# Patient Record
Sex: FEMALE | Race: WHITE | NOT HISPANIC OR LATINO | URBAN - METROPOLITAN AREA
[De-identification: names, ages, dates, MRNs, and addresses within clinical notes are randomized per-mention and may not be internally consistent; named-entity substitution may affect disease eponyms.]

---

## 2017-06-04 ENCOUNTER — EMERGENCY (EMERGENCY)
Facility: HOSPITAL | Age: 52
LOS: 1 days | Discharge: HOME | End: 2017-06-04
Attending: EMERGENCY MEDICINE | Admitting: EMERGENCY MEDICINE
Payer: COMMERCIAL

## 2017-06-04 VITALS
RESPIRATION RATE: 16 BRPM | TEMPERATURE: 98 F | HEART RATE: 63 BPM | OXYGEN SATURATION: 99 % | SYSTOLIC BLOOD PRESSURE: 144 MMHG | DIASTOLIC BLOOD PRESSURE: 63 MMHG

## 2017-06-04 VITALS
SYSTOLIC BLOOD PRESSURE: 162 MMHG | TEMPERATURE: 98 F | DIASTOLIC BLOOD PRESSURE: 91 MMHG | WEIGHT: 149.91 LBS | HEART RATE: 69 BPM | OXYGEN SATURATION: 98 % | HEIGHT: 61 IN | RESPIRATION RATE: 15 BRPM
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
## 2017-06-04 DIAGNOSIS — M54.5 LOW BACK PAIN: ICD-10-CM
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
## 2017-06-04 DIAGNOSIS — S13.4XXA SPRAIN OF LIGAMENTS OF CERVICAL SPINE, INITIAL ENCOUNTER: ICD-10-CM

## 2017-06-04 DIAGNOSIS — S29.011A STRAIN OF MUSCLE AND TENDON OF FRONT WALL OF THORAX, INITIAL ENCOUNTER: ICD-10-CM

## 2017-06-04 PROCEDURE — 72131 CT LUMBAR SPINE W/O DYE: CPT | Mod: 26

## 2017-06-04 PROCEDURE — 71250 CT THORAX DX C-: CPT

## 2017-06-04 PROCEDURE — 72131 CT LUMBAR SPINE W/O DYE: CPT

## 2017-06-04 PROCEDURE — 99284 EMERGENCY DEPT VISIT MOD MDM: CPT

## 2017-06-04 PROCEDURE — 72125 CT NECK SPINE W/O DYE: CPT

## 2017-06-04 PROCEDURE — 71250 CT THORAX DX C-: CPT | Mod: 26

## 2017-06-04 PROCEDURE — 72125 CT NECK SPINE W/O DYE: CPT | Mod: 26

## 2017-06-04 RX ORDER — IBUPROFEN 200 MG
600 TABLET ORAL ONCE
Qty: 0 | Refills: 0 | Status: COMPLETED | OUTPATIENT
Start: 2017-06-04 | End: 2017-06-04

## 2017-06-04 RX ORDER — IBUPROFEN 200 MG
1 TABLET ORAL
Qty: 20 | Refills: 0 | OUTPATIENT
Start: 2017-06-04 | End: 2017-06-09

## 2017-06-04 RX ORDER — CYCLOBENZAPRINE HYDROCHLORIDE 10 MG/1
1 TABLET, FILM COATED ORAL
Qty: 9 | Refills: 0 | OUTPATIENT
Start: 2017-06-04 | End: 2017-06-07

## 2017-06-04 RX ADMIN — Medication 600 MILLIGRAM(S): at 16:47
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
## 2017-06-04 NOTE — ED ADULT NURSE NOTE - CHPI ED SYMPTOMS NEG
no dizziness/no loss of consciousness/no disorientation/no crying/no difficulty bearing weight/no headache/no laceration/no sleeping issues
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
## 2017-06-04 NOTE — ED PROVIDER NOTE - PROGRESS NOTE DETAILS
results discussed, advised follow up with pmd and ortho, given information for Dr García, rx for flexeril and motrin sent to pharmacy, copy of results given
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
## 2019-05-05 ENCOUNTER — INPATIENT (INPATIENT)
Facility: HOSPITAL | Age: 54
LOS: 0 days | Discharge: HOME | DRG: 71 | End: 2019-05-06
Attending: FAMILY MEDICINE | Admitting: HOSPITALIST
Payer: COMMERCIAL

## 2019-05-05 VITALS
WEIGHT: 151.9 LBS | SYSTOLIC BLOOD PRESSURE: 180 MMHG | RESPIRATION RATE: 14 BRPM | DIASTOLIC BLOOD PRESSURE: 78 MMHG | TEMPERATURE: 98 F | HEART RATE: 82 BPM

## 2019-05-05 DIAGNOSIS — Z29.9 ENCOUNTER FOR PROPHYLACTIC MEASURES, UNSPECIFIED: ICD-10-CM

## 2019-05-05 DIAGNOSIS — S09.90XA UNSPECIFIED INJURY OF HEAD, INITIAL ENCOUNTER: ICD-10-CM

## 2019-05-05 DIAGNOSIS — R52 PAIN, UNSPECIFIED: ICD-10-CM

## 2019-05-05 DIAGNOSIS — Z98.890 OTHER SPECIFIED POSTPROCEDURAL STATES: Chronic | ICD-10-CM

## 2019-05-05 LAB
ALBUMIN SERPL ELPH-MCNC: 3.8 G/DL — SIGNIFICANT CHANGE UP (ref 3.3–5)
ALP SERPL-CCNC: 86 U/L — SIGNIFICANT CHANGE UP (ref 40–120)
ALT FLD-CCNC: 31 U/L — SIGNIFICANT CHANGE UP (ref 12–78)
ANION GAP SERPL CALC-SCNC: 7 MMOL/L — SIGNIFICANT CHANGE UP (ref 5–17)
APTT BLD: 31.8 SEC — SIGNIFICANT CHANGE UP (ref 27.5–36.3)
AST SERPL-CCNC: 20 U/L — SIGNIFICANT CHANGE UP (ref 15–37)
BILIRUB SERPL-MCNC: 0.2 MG/DL — SIGNIFICANT CHANGE UP (ref 0.2–1.2)
BUN SERPL-MCNC: 17 MG/DL — SIGNIFICANT CHANGE UP (ref 7–23)
CALCIUM SERPL-MCNC: 8.7 MG/DL — SIGNIFICANT CHANGE UP (ref 8.5–10.1)
CHLORIDE SERPL-SCNC: 107 MMOL/L — SIGNIFICANT CHANGE UP (ref 96–108)
CO2 SERPL-SCNC: 27 MMOL/L — SIGNIFICANT CHANGE UP (ref 22–31)
CREAT SERPL-MCNC: 0.69 MG/DL — SIGNIFICANT CHANGE UP (ref 0.5–1.3)
GLUCOSE SERPL-MCNC: 98 MG/DL — SIGNIFICANT CHANGE UP (ref 70–99)
HCG SERPL-ACNC: 3 MIU/ML — SIGNIFICANT CHANGE UP
HCT VFR BLD CALC: 33.4 % — LOW (ref 34.5–45)
HGB BLD-MCNC: 11.4 G/DL — LOW (ref 11.5–15.5)
INR BLD: 0.99 RATIO — SIGNIFICANT CHANGE UP (ref 0.88–1.16)
MCHC RBC-ENTMCNC: 29.5 PG — SIGNIFICANT CHANGE UP (ref 27–34)
MCHC RBC-ENTMCNC: 34.1 GM/DL — SIGNIFICANT CHANGE UP (ref 32–36)
MCV RBC AUTO: 86.5 FL — SIGNIFICANT CHANGE UP (ref 80–100)
NRBC # BLD: 0 /100 WBCS — SIGNIFICANT CHANGE UP (ref 0–0)
PLATELET # BLD AUTO: 274 K/UL — SIGNIFICANT CHANGE UP (ref 150–400)
POTASSIUM SERPL-MCNC: 3.7 MMOL/L — SIGNIFICANT CHANGE UP (ref 3.5–5.3)
POTASSIUM SERPL-SCNC: 3.7 MMOL/L — SIGNIFICANT CHANGE UP (ref 3.5–5.3)
PROT SERPL-MCNC: 7.3 G/DL — SIGNIFICANT CHANGE UP (ref 6–8.3)
PROTHROM AB SERPL-ACNC: 11.2 SEC — SIGNIFICANT CHANGE UP (ref 10–12.9)
RBC # BLD: 3.86 M/UL — SIGNIFICANT CHANGE UP (ref 3.8–5.2)
RBC # FLD: 12.5 % — SIGNIFICANT CHANGE UP (ref 10.3–14.5)
SODIUM SERPL-SCNC: 141 MMOL/L — SIGNIFICANT CHANGE UP (ref 135–145)
WBC # BLD: 6.55 K/UL — SIGNIFICANT CHANGE UP (ref 3.8–10.5)
WBC # FLD AUTO: 6.55 K/UL — SIGNIFICANT CHANGE UP (ref 3.8–10.5)

## 2019-05-05 PROCEDURE — 99223 1ST HOSP IP/OBS HIGH 75: CPT | Mod: AI,GC

## 2019-05-05 PROCEDURE — 70450 CT HEAD/BRAIN W/O DYE: CPT | Mod: 26

## 2019-05-05 PROCEDURE — 99285 EMERGENCY DEPT VISIT HI MDM: CPT

## 2019-05-05 PROCEDURE — 93010 ELECTROCARDIOGRAM REPORT: CPT

## 2019-05-05 RX ORDER — CYCLOBENZAPRINE HYDROCHLORIDE 10 MG/1
10 TABLET, FILM COATED ORAL THREE TIMES A DAY
Qty: 0 | Refills: 0 | Status: DISCONTINUED | OUTPATIENT
Start: 2019-05-05 | End: 2019-05-05

## 2019-05-05 RX ORDER — ONDANSETRON 8 MG/1
4 TABLET, FILM COATED ORAL EVERY 6 HOURS
Qty: 0 | Refills: 0 | Status: DISCONTINUED | OUTPATIENT
Start: 2019-05-05 | End: 2019-05-06

## 2019-05-05 RX ORDER — MECLIZINE HCL 12.5 MG
25 TABLET ORAL ONCE
Qty: 0 | Refills: 0 | Status: COMPLETED | OUTPATIENT
Start: 2019-05-05 | End: 2019-05-05

## 2019-05-05 RX ORDER — ACETAMINOPHEN 500 MG
650 TABLET ORAL EVERY 6 HOURS
Qty: 0 | Refills: 0 | Status: DISCONTINUED | OUTPATIENT
Start: 2019-05-05 | End: 2019-05-06

## 2019-05-05 RX ORDER — ACETAMINOPHEN 500 MG
650 TABLET ORAL ONCE
Qty: 0 | Refills: 0 | Status: COMPLETED | OUTPATIENT
Start: 2019-05-05 | End: 2019-05-05

## 2019-05-05 RX ORDER — CYCLOBENZAPRINE HYDROCHLORIDE 10 MG/1
5 TABLET, FILM COATED ORAL ONCE
Qty: 0 | Refills: 0 | Status: COMPLETED | OUTPATIENT
Start: 2019-05-05 | End: 2019-05-05

## 2019-05-05 RX ORDER — TETANUS TOXOID, REDUCED DIPHTHERIA TOXOID AND ACELLULAR PERTUSSIS VACCINE, ADSORBED 5; 2.5; 8; 8; 2.5 [IU]/.5ML; [IU]/.5ML; UG/.5ML; UG/.5ML; UG/.5ML
0.5 SUSPENSION INTRAMUSCULAR ONCE
Qty: 0 | Refills: 0 | Status: COMPLETED | OUTPATIENT
Start: 2019-05-05 | End: 2019-05-05

## 2019-05-05 RX ADMIN — TETANUS TOXOID, REDUCED DIPHTHERIA TOXOID AND ACELLULAR PERTUSSIS VACCINE, ADSORBED 0.5 MILLILITER(S): 5; 2.5; 8; 8; 2.5 SUSPENSION INTRAMUSCULAR at 19:12

## 2019-05-05 RX ADMIN — Medication 650 MILLIGRAM(S): at 18:57

## 2019-05-05 RX ADMIN — Medication 25 MILLIGRAM(S): at 18:30

## 2019-05-05 RX ADMIN — Medication 650 MILLIGRAM(S): at 18:30

## 2019-05-05 RX ADMIN — CYCLOBENZAPRINE HYDROCHLORIDE 5 MILLIGRAM(S): 10 TABLET, FILM COATED ORAL at 18:30
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
## 2019-05-05 NOTE — H&P ADULT - NSHPREVIEWOFSYSTEMS_GEN_ALL_CORE
Constitutional: denies fever, chills  HEENT: admits L sided blurry vision and orbital pressure NO difficulty hearing  Respiratory: denies SOB, MORALES, cough, sputum production, wheezing  Cardiovascular: denies CP, palpitations, LE edema  Gastrointestinal: denies nausea, vomiting, diarrhea, constipation, abdominal pain  Genitourinary: denies dysuria, frequency, urgency, hematuria   Skin: Denies rashes, itching  Musculoskeletal: Admits left sided body aches and muscle weakness  Neurologic: Admits diffuse headache, no loss of sensation, n/t   Hematology/Oncology: admits left arm bruising secondary to fall denies bleeding, easy bruising  Psych: Denies changes in mood  ROS negative except as noted above

## 2019-05-05 NOTE — ED ADULT NURSE NOTE - NSIMPLEMENTINTERV_GEN_ALL_ED
Implemented All Fall Risk Interventions:  [place] to call system. Call bell, personal items and telephone within reach. Instruct patient to call for assistance. Room bathroom lighting operational. Non-slip footwear when patient is off stretcher. Physically safe environment: no spills, clutter or unnecessary equipment. Stretcher in lowest position, wheels locked, appropriate side rails in place. Provide visual cue, wrist band, yellow gown, etc. Monitor gait and stability. Monitor for mental status changes and reorient to person, place, and time. Review medications for side effects contributing to fall risk. Reinforce activity limits and safety measures with patient and family.

## 2019-05-05 NOTE — H&P ADULT - PROBLEM SELECTOR PLAN 3
IMPROVE VTE Individual Risk Assessment          RISK                                                          Points  [  ] Previous VTE                                                3  [  ] Thrombophilia                                             2  [  ] Lower limb paralysis                                   2        (unable to hold up >15 seconds)    [  ] Current Cancer                                             2         (within 6 months)  [  ] Immobilization > 24 hrs                              1  [  ] ICU/CCU stay > 24 hours                             1  [  ] Age > 60                                                         1    IMPROVE VTE Score: 0    SCD's, encourage ambulation

## 2019-05-05 NOTE — PATIENT PROFILE ADULT - NSPROCHRONICPAINRELIEVE_GEN_A_NUR
Medication refilled for 90 days per protocol. Patient advised.      Hypertensive Medications  Protocol Criteria:  · Appointment scheduled in the past 6 months or in the next 3 months  · BMP or CMP in the past 12 months  · Creatinine result < 2  Recent Outpa medications
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
## 2019-05-05 NOTE — H&P ADULT - PROBLEM SELECTOR PLAN 1
Admit to [facility] Admit to [facility]  CT head showing: Right frontal white matter hypodensity likely indicating vasogenic edema  Neuro checks Q4  MRI in AM  Zofran for nausea  Neurology consulted Dr. Fraire  Ambulate with assistance
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
## 2019-05-05 NOTE — H&P ADULT - NSHPPHYSICALEXAM_GEN_ALL_CORE
Physical Exam:  General: Well developed, well nourished, NAD  HEENT: linear scar on left forehead, tender to palpation of forehead b/l, PERRLA, EOMI bl, moist mucous membranes   Neck: Supple, nontender, no mass  Neurology: A&Ox3, nonfocal, CN II-XII grossly intact, sensation intact   Respiratory: CTA B/L, No W/R/R  CV: RRR, +S1/S2, no murmurs, rubs or gallops  Abdominal: Soft, NT, ND +BSx4, no palpable masses  Extremities: No C/C/E, + peripheral pulses  MSK: muscle strength 4/5 LUE and LLE secondary to pain, 5/5 RUE, RLE   Heme: No palpable supraclavicular nodules, no petechiae   Skin: warm, dry, normal color Physical Exam:  General: Well developed, well nourished, NAD  HEENT: linear scar on left forehead, tender to palpation of forehead b/l, PERRLA, EOMI bl, moist mucous membranes   Neck: Supple, nontender, no mass  Neurology: A&Ox3, nonfocal, CN II-XII grossly intact, sensation intact   Respiratory: CTA B/L, No W/R/R  CV: RRR, +S1/S2, no murmurs, rubs or gallops  Abdominal: Soft, NT, ND +BSx4, no palpable masses  Extremities: No C/C/E, + peripheral pulses  MSK: muscle strength 5/5 LUE and LLE ROM ltd secondary to pain, 5/5 RUE, RLE   Heme: No palpable supraclavicular nodules, no petechiae   Skin: warm, dry, normal color

## 2019-05-05 NOTE — H&P ADULT - NSHPSOCIALHISTORY_GEN_ALL_CORE
Lives at home with   Does not require assistance with ambulation  Refuses flu vaccine, HIV/ Hep C  Denies tobacco, alcohol and drug use  lmp 6 yrs ago Lives at home with   Does not require assistance with ambulation  Refuses flu vaccine, HIV screening  Denies tobacco, alcohol and drug use  lmp 6 yrs ago

## 2019-05-05 NOTE — H&P ADULT - ATTENDING COMMENTS
Pt with vasogenic edema.  No further intervention for tonight per Neuro.  Recommends MRI in the morning.
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
## 2019-05-06 VITALS
RESPIRATION RATE: 16 BRPM | DIASTOLIC BLOOD PRESSURE: 74 MMHG | TEMPERATURE: 98 F | SYSTOLIC BLOOD PRESSURE: 120 MMHG | HEART RATE: 62 BPM | OXYGEN SATURATION: 97 %

## 2019-05-06 LAB
ALBUMIN SERPL ELPH-MCNC: 3.5 G/DL — SIGNIFICANT CHANGE UP (ref 3.3–5)
ALP SERPL-CCNC: 80 U/L — SIGNIFICANT CHANGE UP (ref 40–120)
ALT FLD-CCNC: 30 U/L — SIGNIFICANT CHANGE UP (ref 12–78)
ANION GAP SERPL CALC-SCNC: 5 MMOL/L — SIGNIFICANT CHANGE UP (ref 5–17)
AST SERPL-CCNC: 17 U/L — SIGNIFICANT CHANGE UP (ref 15–37)
BILIRUB SERPL-MCNC: 0.4 MG/DL — SIGNIFICANT CHANGE UP (ref 0.2–1.2)
BUN SERPL-MCNC: 14 MG/DL — SIGNIFICANT CHANGE UP (ref 7–23)
CALCIUM SERPL-MCNC: 8.7 MG/DL — SIGNIFICANT CHANGE UP (ref 8.5–10.1)
CHLORIDE SERPL-SCNC: 108 MMOL/L — SIGNIFICANT CHANGE UP (ref 96–108)
CO2 SERPL-SCNC: 30 MMOL/L — SIGNIFICANT CHANGE UP (ref 22–31)
CREAT SERPL-MCNC: 0.68 MG/DL — SIGNIFICANT CHANGE UP (ref 0.5–1.3)
GLUCOSE SERPL-MCNC: 100 MG/DL — HIGH (ref 70–99)
HCT VFR BLD CALC: 33 % — LOW (ref 34.5–45)
HCV AB S/CO SERPL IA: 0.07 S/CO — SIGNIFICANT CHANGE UP (ref 0–0.99)
HCV AB SERPL-IMP: SIGNIFICANT CHANGE UP
HGB BLD-MCNC: 11.1 G/DL — LOW (ref 11.5–15.5)
MCHC RBC-ENTMCNC: 29.8 PG — SIGNIFICANT CHANGE UP (ref 27–34)
MCHC RBC-ENTMCNC: 33.6 GM/DL — SIGNIFICANT CHANGE UP (ref 32–36)
MCV RBC AUTO: 88.5 FL — SIGNIFICANT CHANGE UP (ref 80–100)
NRBC # BLD: 0 /100 WBCS — SIGNIFICANT CHANGE UP (ref 0–0)
PLATELET # BLD AUTO: 259 K/UL — SIGNIFICANT CHANGE UP (ref 150–400)
POTASSIUM SERPL-MCNC: 4 MMOL/L — SIGNIFICANT CHANGE UP (ref 3.5–5.3)
POTASSIUM SERPL-SCNC: 4 MMOL/L — SIGNIFICANT CHANGE UP (ref 3.5–5.3)
PROT SERPL-MCNC: 6.7 G/DL — SIGNIFICANT CHANGE UP (ref 6–8.3)
RBC # BLD: 3.73 M/UL — LOW (ref 3.8–5.2)
RBC # FLD: 12.5 % — SIGNIFICANT CHANGE UP (ref 10.3–14.5)
SODIUM SERPL-SCNC: 143 MMOL/L — SIGNIFICANT CHANGE UP (ref 135–145)
WBC # BLD: 4.53 K/UL — SIGNIFICANT CHANGE UP (ref 3.8–10.5)
WBC # FLD AUTO: 4.53 K/UL — SIGNIFICANT CHANGE UP (ref 3.8–10.5)

## 2019-05-06 PROCEDURE — 70553 MRI BRAIN STEM W/O & W/DYE: CPT | Mod: 26

## 2019-05-06 PROCEDURE — 70544 MR ANGIOGRAPHY HEAD W/O DYE: CPT

## 2019-05-06 PROCEDURE — 70553 MRI BRAIN STEM W/O & W/DYE: CPT

## 2019-05-06 PROCEDURE — 70450 CT HEAD/BRAIN W/O DYE: CPT

## 2019-05-06 PROCEDURE — 85730 THROMBOPLASTIN TIME PARTIAL: CPT

## 2019-05-06 PROCEDURE — 85610 PROTHROMBIN TIME: CPT

## 2019-05-06 PROCEDURE — 85027 COMPLETE CBC AUTOMATED: CPT

## 2019-05-06 PROCEDURE — 80053 COMPREHEN METABOLIC PANEL: CPT

## 2019-05-06 PROCEDURE — 93005 ELECTROCARDIOGRAM TRACING: CPT

## 2019-05-06 PROCEDURE — 86803 HEPATITIS C AB TEST: CPT

## 2019-05-06 PROCEDURE — 70544 MR ANGIOGRAPHY HEAD W/O DYE: CPT | Mod: 26,59

## 2019-05-06 PROCEDURE — 99285 EMERGENCY DEPT VISIT HI MDM: CPT | Mod: 25

## 2019-05-06 PROCEDURE — 90471 IMMUNIZATION ADMIN: CPT

## 2019-05-06 PROCEDURE — 90715 TDAP VACCINE 7 YRS/> IM: CPT

## 2019-05-06 PROCEDURE — 99239 HOSP IP/OBS DSCHRG MGMT >30: CPT

## 2019-05-06 PROCEDURE — 84702 CHORIONIC GONADOTROPIN TEST: CPT

## 2019-05-06 PROCEDURE — 36415 COLL VENOUS BLD VENIPUNCTURE: CPT

## 2019-05-06 RX ORDER — ACETAMINOPHEN 500 MG
2 TABLET ORAL
Qty: 0 | Refills: 0 | COMMUNITY
Start: 2019-05-06

## 2019-05-06 RX ORDER — ONDANSETRON 8 MG/1
1 TABLET, FILM COATED ORAL
Qty: 12 | Refills: 0 | OUTPATIENT
Start: 2019-05-06 | End: 2019-05-08

## 2019-05-06 RX ADMIN — Medication 650 MILLIGRAM(S): at 09:06

## 2019-05-06 RX ADMIN — Medication 650 MILLIGRAM(S): at 02:14

## 2019-05-06 RX ADMIN — Medication 650 MILLIGRAM(S): at 10:12

## 2019-05-06 RX ADMIN — Medication 650 MILLIGRAM(S): at 02:44
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
## 2019-05-06 NOTE — PROGRESS NOTE ADULT - SUBJECTIVE AND OBJECTIVE BOX
neuro cons dict.  presented with HA/DIZZINESS   CT HEAD REPORTED AS RIGHT FRONTAL HYPODENSITY.  BRAIN MRI WWO.

## 2019-05-06 NOTE — DISCHARGE NOTE PROVIDER - CARE PROVIDER_API CALL
Wild Bess)  Neurological Surgery  [address], [address]  [place] [address]  Phone: [phone]  Fax: [phone]  Follow Up Time: 1 week    Reilly Fraire)  Neurology  [address] [place] [address]  Phone: [phone]  Fax: [phone]  Follow Up Time: 1 week    Trey Sagastume)  Family Medicine  [address] [place] [address]  Phone: [phone]  Fax: [phone]  Follow Up Time: 1 week

## 2019-05-06 NOTE — DISCHARGE NOTE NURSING/CASE MANAGEMENT/SOCIAL WORK - NSDCDPATPORTLINK_GEN_ALL_CORE
You can access the [facility] Patient Portal, offered by [facility], by registering with the following website: [email or web address]
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
## 2019-05-06 NOTE — DISCHARGE NOTE PROVIDER - NSDCFUADDINST_GEN_ALL_CORE_FT
pt responsible to make and attend all follow up appts within 1 week of discharge for further care and management  please apply cold compresses and take tylenol or ibuprofen as needed as directed for facial pain or swelling
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
## 2025-04-26 NOTE — ED PROVIDER NOTE - NOTES
lesion and sx discussed, wants no steroids, but admit since vertigo sx and mri and consult in am
Attending with